# Patient Record
Sex: FEMALE | Race: WHITE
[De-identification: names, ages, dates, MRNs, and addresses within clinical notes are randomized per-mention and may not be internally consistent; named-entity substitution may affect disease eponyms.]

---

## 2021-09-17 ENCOUNTER — HOSPITAL ENCOUNTER (INPATIENT)
Dept: HOSPITAL 79 - ER1 | Age: 66
LOS: 22 days | DRG: 207 | End: 2021-10-09
Attending: SURGERY | Admitting: INTERNAL MEDICINE
Payer: MEDICARE

## 2021-09-17 VITALS — WEIGHT: 214 LBS | BODY MASS INDEX: 36.54 KG/M2 | HEIGHT: 64 IN

## 2021-09-17 DIAGNOSIS — U07.1: Primary | ICD-10-CM

## 2021-09-17 DIAGNOSIS — E66.9: ICD-10-CM

## 2021-09-17 DIAGNOSIS — E87.2: ICD-10-CM

## 2021-09-17 DIAGNOSIS — Z82.49: ICD-10-CM

## 2021-09-17 DIAGNOSIS — N17.0: ICD-10-CM

## 2021-09-17 DIAGNOSIS — Q61.3: ICD-10-CM

## 2021-09-17 DIAGNOSIS — R65.21: ICD-10-CM

## 2021-09-17 DIAGNOSIS — Z87.891: ICD-10-CM

## 2021-09-17 DIAGNOSIS — Z51.5: ICD-10-CM

## 2021-09-17 DIAGNOSIS — Z79.4: ICD-10-CM

## 2021-09-17 DIAGNOSIS — Z90.710: ICD-10-CM

## 2021-09-17 DIAGNOSIS — J12.82: ICD-10-CM

## 2021-09-17 DIAGNOSIS — R53.81: ICD-10-CM

## 2021-09-17 DIAGNOSIS — J15.9: ICD-10-CM

## 2021-09-17 DIAGNOSIS — I10: ICD-10-CM

## 2021-09-17 DIAGNOSIS — Z79.82: ICD-10-CM

## 2021-09-17 DIAGNOSIS — Z99.81: ICD-10-CM

## 2021-09-17 DIAGNOSIS — Z79.899: ICD-10-CM

## 2021-09-17 DIAGNOSIS — E87.5: ICD-10-CM

## 2021-09-17 DIAGNOSIS — Z79.52: ICD-10-CM

## 2021-09-17 DIAGNOSIS — B96.1: ICD-10-CM

## 2021-09-17 DIAGNOSIS — A41.9: ICD-10-CM

## 2021-09-17 DIAGNOSIS — E87.1: ICD-10-CM

## 2021-09-17 DIAGNOSIS — F41.9: ICD-10-CM

## 2021-09-17 DIAGNOSIS — J80: ICD-10-CM

## 2021-09-17 DIAGNOSIS — J98.2: ICD-10-CM

## 2021-09-17 DIAGNOSIS — Z23: ICD-10-CM

## 2021-09-17 DIAGNOSIS — Z66: ICD-10-CM

## 2021-09-17 LAB
BUN/CREATININE RATIO: 13 (ref 0–10)
HGB BLD-MCNC: 15.1 GM/DL (ref 12.3–15.3)
RED BLOOD COUNT: 4.95 M/UL (ref 4–5.1)
WHITE BLOOD COUNT: 4 K/UL (ref 4.5–11)

## 2021-09-17 PROCEDURE — 3E0333Z INTRODUCTION OF ANTI-INFLAMMATORY INTO PERIPHERAL VEIN, PERCUTANEOUS APPROACH: ICD-10-PCS | Performed by: INTERNAL MEDICINE

## 2021-09-17 PROCEDURE — C1752 CATH,HEMODIALYSIS,SHORT-TERM: HCPCS

## 2021-09-17 PROCEDURE — C1751 CATH, INF, PER/CENT/MIDLINE: HCPCS

## 2021-09-17 PROCEDURE — 8E0ZXY6 ISOLATION: ICD-10-PCS | Performed by: INTERNAL MEDICINE

## 2021-09-17 PROCEDURE — U0002 COVID-19 LAB TEST NON-CDC: HCPCS

## 2021-09-17 PROCEDURE — C9113 INJ PANTOPRAZOLE SODIUM, VIA: HCPCS

## 2021-09-17 PROCEDURE — XW043E5 INTRODUCTION OF REMDESIVIR ANTI-INFECTIVE INTO CENTRAL VEIN, PERCUTANEOUS APPROACH, NEW TECHNOLOGY GROUP 5: ICD-10-PCS | Performed by: INTERNAL MEDICINE

## 2021-09-17 PROCEDURE — P9047 ALBUMIN (HUMAN), 25%, 50ML: HCPCS

## 2021-09-17 PROCEDURE — 5A09557 ASSISTANCE WITH RESPIRATORY VENTILATION, GREATER THAN 96 CONSECUTIVE HOURS, CONTINUOUS POSITIVE AIRWAY PRESSURE: ICD-10-PCS | Performed by: INTERNAL MEDICINE

## 2021-09-18 LAB
BUN/CREATININE RATIO: 16 (ref 0–10)
HGB BLD-MCNC: 15.2 GM/DL (ref 12.3–15.3)
RED BLOOD COUNT: 5.17 M/UL (ref 4–5.1)
WHITE BLOOD COUNT: 4.7 K/UL (ref 4.5–11)

## 2021-09-19 LAB — BUN/CREATININE RATIO: 21 (ref 0–10)

## 2021-09-20 LAB — BUN/CREATININE RATIO: 27 (ref 0–10)

## 2021-09-21 NOTE — NUR
RESPIRTORY CONTACTED TO PLACE PATIENT ON AIRVO PER MD ORDERS. PATIENT RESTING
IN BED WITH NO COMPLAITNS AT THIS TIME. CALL LIGHT IN REACH BED IN LOW
POSITION AND SIDE RAILS ELVEATED

## 2021-09-21 NOTE — NUR
received calls from telemetry- patient pulxe ox on 80's. patient no other
complaints. RT on board of patient airvo and adjust as indicated. will cont to
monitor

## 2021-09-22 LAB
BUN/CREATININE RATIO: 28 (ref 0–10)
HGB BLD-MCNC: 16.1 GM/DL (ref 12.3–15.3)
RED BLOOD COUNT: 5.35 M/UL (ref 4–5.1)
WHITE BLOOD COUNT: 12.3 K/UL (ref 4.5–11)

## 2021-09-22 NOTE — NUR
patient episode of pulse ox on 70'S during patient eating breakfast and was on
airvo. after meal patient put back to bipap pulse ox on low 87-94

## 2021-09-23 LAB
BUN/CREATININE RATIO: 25 (ref 0–10)
HGB BLD-MCNC: 15.9 GM/DL (ref 12.3–15.3)
RED BLOOD COUNT: 5.35 M/UL (ref 4–5.1)
WHITE BLOOD COUNT: 13.3 K/UL (ref 4.5–11)

## 2021-09-24 LAB
BUN/CREATININE RATIO: 25 (ref 0–10)
HGB BLD-MCNC: 15.9 GM/DL (ref 12.3–15.3)
RED BLOOD COUNT: 5.24 M/UL (ref 4–5.1)
WHITE BLOOD COUNT: 14.1 K/UL (ref 4.5–11)

## 2021-09-24 NOTE — NUR
0830- NOTIFIED DR GERMAIN OF RASH TO UPPER AND LOWER EXTREMITIES AND ABD AND
TELE READING OF FREQUENT PVCS. MD STATED TO HOLD MORNING ANTIBIOTCS AND HE
WOULD FOLLOW UP WITH PATIENT

## 2021-09-25 LAB
BUN/CREATININE RATIO: 31 (ref 0–10)
HGB BLD-MCNC: 16.7 GM/DL (ref 12.3–15.3)
RED BLOOD COUNT: 5.53 M/UL (ref 4–5.1)
WHITE BLOOD COUNT: 13.7 K/UL (ref 4.5–11)

## 2021-09-25 PROCEDURE — XW033H5 INTRODUCTION OF TOCILIZUMAB INTO PERIPHERAL VEIN, PERCUTANEOUS APPROACH, NEW TECHNOLOGY GROUP 5: ICD-10-PCS | Performed by: INTERNAL MEDICINE

## 2021-09-26 LAB
BUN/CREATININE RATIO: 42 (ref 0–10)
HGB BLD-MCNC: 16.3 GM/DL (ref 12.3–15.3)
RED BLOOD COUNT: 5.34 M/UL (ref 4–5.1)
WHITE BLOOD COUNT: 10.9 K/UL (ref 4.5–11)

## 2021-09-27 LAB
BUN/CREATININE RATIO: 57 (ref 0–10)
HGB BLD-MCNC: 17 GM/DL (ref 12.3–15.3)
RED BLOOD COUNT: 5.63 M/UL (ref 4–5.1)
WHITE BLOOD COUNT: 12.1 K/UL (ref 4.5–11)

## 2021-09-28 PROCEDURE — 5A1955Z RESPIRATORY VENTILATION, GREATER THAN 96 CONSECUTIVE HOURS: ICD-10-PCS | Performed by: INTERNAL MEDICINE

## 2021-09-28 PROCEDURE — 0BH18EZ INSERTION OF ENDOTRACHEAL AIRWAY INTO TRACHEA, VIA NATURAL OR ARTIFICIAL OPENING ENDOSCOPIC: ICD-10-PCS | Performed by: INTERNAL MEDICINE

## 2021-09-29 LAB
BUN/CREATININE RATIO: 30 (ref 0–10)
BUN/CREATININE RATIO: 31 (ref 0–10)
HGB BLD-MCNC: 16.4 GM/DL (ref 12.3–15.3)
RED BLOOD COUNT: 5.63 M/UL (ref 4–5.1)
WHITE BLOOD COUNT: 22.4 K/UL (ref 4.5–11)

## 2021-09-29 PROCEDURE — 02HV33Z INSERTION OF INFUSION DEVICE INTO SUPERIOR VENA CAVA, PERCUTANEOUS APPROACH: ICD-10-PCS | Performed by: INTERNAL MEDICINE

## 2021-09-29 PROCEDURE — 3E033XZ INTRODUCTION OF VASOPRESSOR INTO PERIPHERAL VEIN, PERCUTANEOUS APPROACH: ICD-10-PCS | Performed by: INTERNAL MEDICINE

## 2021-09-30 LAB
BUN/CREATININE RATIO: 45 (ref 0–10)
HGB BLD-MCNC: 12.3 GM/DL (ref 12.3–15.3)
RED BLOOD COUNT: 4.19 M/UL (ref 4–5.1)
WHITE BLOOD COUNT: 13.1 K/UL (ref 4.5–11)

## 2021-10-01 LAB
BUN/CREATININE RATIO: 45 (ref 0–10)
HGB BLD-MCNC: 11.6 GM/DL (ref 12.3–15.3)
RED BLOOD COUNT: 3.92 M/UL (ref 4–5.1)
WHITE BLOOD COUNT: 12.3 K/UL (ref 4.5–11)

## 2021-10-02 LAB
BUN/CREATININE RATIO: 37 (ref 0–10)
HGB BLD-MCNC: 11.2 GM/DL (ref 12.3–15.3)
RED BLOOD COUNT: 3.79 M/UL (ref 4–5.1)
WHITE BLOOD COUNT: 12 K/UL (ref 4.5–11)

## 2021-10-02 NOTE — NUR
0050: PT O2 DROPPED TO 82% SUCTIONED PT AND OBSERVED BRIGHT RED BLOOD IN TUBE,
PT O2 DROPPED DOWN TO 76%, RT AND STAFF IN THE ROOM, BEGAN TO BAG PT FOR
COUPLE OF MINUTES, PT 02 CAME BACK UP TO 90%.
 
0100:  AWARE OF PT CONDITION, SEE ORDERS AND MAR.
 
0130: CALLED  TO INFORM OF PT CONDITION CHANGE

## 2021-10-03 LAB
BUN/CREATININE RATIO: 36 (ref 0–10)
HGB BLD-MCNC: 12.9 GM/DL (ref 12.3–15.3)
RED BLOOD COUNT: 4.34 M/UL (ref 4–5.1)
WHITE BLOOD COUNT: 12.4 K/UL (ref 4.5–11)

## 2021-10-04 LAB
BUN/CREATININE RATIO: 36 (ref 0–10)
HGB BLD-MCNC: 12.6 GM/DL (ref 12.3–15.3)
RED BLOOD COUNT: 4.22 M/UL (ref 4–5.1)
WHITE BLOOD COUNT: 11.2 K/UL (ref 4.5–11)

## 2021-10-05 LAB — BUN/CREATININE RATIO: 27 (ref 0–10)

## 2021-10-06 LAB — BUN/CREATININE RATIO: 20 (ref 0–10)

## 2021-10-07 LAB
BUN/CREATININE RATIO: 15 (ref 0–10)
HGB BLD-MCNC: 12.3 GM/DL (ref 12.3–15.3)
RED BLOOD COUNT: 4.03 M/UL (ref 4–5.1)
WHITE BLOOD COUNT: 16.8 K/UL (ref 4.5–11)

## 2021-10-08 LAB
BUN/CREATININE RATIO: 13 (ref 0–10)
HEP C VIRUS AB: <0.1 (ref 0–0.9)
HGB BLD-MCNC: 10.8 GM/DL (ref 12.3–15.3)
HGB BLD-MCNC: 10.9 GM/DL (ref 12.3–15.3)
RED BLOOD COUNT: 3.61 M/UL (ref 4–5.1)
RED BLOOD COUNT: 3.74 M/UL (ref 4–5.1)
WHITE BLOOD COUNT: 12.9 K/UL (ref 4.5–11)
WHITE BLOOD COUNT: 16 K/UL (ref 4.5–11)